# Patient Record
(demographics unavailable — no encounter records)

---

## 2025-02-13 NOTE — PHYSICAL EXAM
[Sclera] : the sclera and conjunctiva were normal [Auscultation Breath Sounds / Voice Sounds] : lungs were clear to auscultation bilaterally [Heart Rate And Rhythm] : heart rate was normal and rhythm regular [Heart Sounds] : normal S1 and S2 [Murmurs] : no murmurs [Abdomen Soft] : soft [Abdomen Tenderness] : non-tender [Cervical Lymph Nodes Enlarged Posterior Bilaterally] : posterior cervical [Cervical Lymph Nodes Enlarged Anterior Bilaterally] : anterior cervical [Supraclavicular Lymph Nodes Enlarged Bilaterally] : supraclavicular [Musculoskeletal - Swelling] : no joint swelling seen [FreeTextEntry1] : pt declined the feet/ankle exam  [] : no rash [Skin Lesions] : no skin lesions [Oriented To Time, Place, And Person] : oriented to person, place, and time

## 2025-02-13 NOTE — DATA REVIEWED
[FreeTextEntry1] : Labs reviewed from 7/24 MIKE 1:320 homogenous  eosinophils 0/91 uric acid, RF, CCP, ESR wnl  CRP 11

## 2025-02-13 NOTE — CONSULT LETTER
[Dear  ___] : Dear  [unfilled], [Consult Letter:] : I had the pleasure of evaluating your patient, [unfilled]. [Consult Closing:] : Thank you very much for allowing me to participate in the care of this patient.  If you have any questions, please do not hesitate to contact me. [Sincerely,] : Sincerely, [FreeTextEntry3] : Reyes Bragg MD

## 2025-02-13 NOTE — ASSESSMENT
[FreeTextEntry1] : 53 y/o F w polyarthralgias, polymyalgias =pain in neck, shoulders, thighs =worse in am, improved w activities =no stiffness, swelling =labs 7/24 w high MIKE, mild CRP elevation (pt obese); negative ESR, CRP, RF, CCP  ddx PMR vs SLE (less suspicion) vs seronegative RA vs OA vs FM. Will obtain serologies and imaging.   Plan -Labs w serologies, inflammatory markers -Xrays shoulders, cervical spine  RTO depending on above results

## 2025-02-13 NOTE — HISTORY OF PRESENT ILLNESS
[FreeTextEntry1] : 53 y/o here for consultation  Pt reports pain for over 1 year.  Pt had pain in shoulders, neck, thighs. No stiffness, swelling. Does report ankle swelling at night.  Pt says the pain is worse in am. Pt says activities make the pain better.   Pt had test done which showed positive MIKE.  Pt has tried supplements for a short time, but it did not help  Pt has not tried anything else for pain.   No fevers, h/a, rashes, hair loss, oral ulcers, epistaxis, sinusitis,  swollen glands,  CP, cough, vision changes, abdominal pain, GERD, n/v/d, blood in stool or urine, focal weakness, sensory loss,  Raynaud's, weight loss.  +SOB from smoking  +dry eyes (pt thinks so not sure) , dry mouth   OB: no hx of miscarriage; no hx of preeclampsia